# Patient Record
Sex: FEMALE | Race: ASIAN | NOT HISPANIC OR LATINO | URBAN - METROPOLITAN AREA
[De-identification: names, ages, dates, MRNs, and addresses within clinical notes are randomized per-mention and may not be internally consistent; named-entity substitution may affect disease eponyms.]

---

## 2019-02-09 ENCOUNTER — EMERGENCY (EMERGENCY)
Facility: HOSPITAL | Age: 59
LOS: 1 days | Discharge: HOME | End: 2019-02-09
Attending: EMERGENCY MEDICINE | Admitting: EMERGENCY MEDICINE
Payer: SELF-PAY

## 2019-02-09 VITALS
RESPIRATION RATE: 18 BRPM | SYSTOLIC BLOOD PRESSURE: 146 MMHG | DIASTOLIC BLOOD PRESSURE: 78 MMHG | HEART RATE: 55 BPM | TEMPERATURE: 98 F | OXYGEN SATURATION: 97 % | WEIGHT: 99.21 LBS

## 2019-02-09 VITALS — DIASTOLIC BLOOD PRESSURE: 66 MMHG | SYSTOLIC BLOOD PRESSURE: 143 MMHG | HEART RATE: 57 BPM

## 2019-02-09 PROCEDURE — 99283 EMERGENCY DEPT VISIT LOW MDM: CPT

## 2019-02-09 RX ORDER — LANOLIN/MINERAL OIL
1 LOTION (ML) TOPICAL
Qty: 396 | Refills: 2 | OUTPATIENT
Start: 2019-02-09 | End: 2019-05-09

## 2019-02-09 NOTE — ED PROVIDER NOTE - MEDICAL DECISION MAKING DETAILS
Patient presenting with dry skin/dermatitis NOS. Will Rx Aquaphor and triamcinolone. Rec Derm fu and getting a PMD.

## 2019-02-09 NOTE — ED PROVIDER NOTE - OBJECTIVE STATEMENT
58 F presenting with dry skin and itchy rash x 2 mos. She has no PMD and uses the ED for primary care. Not using anything for it. No new products or creams. No hx of same.

## 2019-02-09 NOTE — ED PROVIDER NOTE - CARE PROVIDER_API CALL
Sallie Martinez)  Dermatology  [address] [place] [address], [place] [address]  Phone: [phone]  Fax: [phone]  Follow Up Time:

## 2019-02-09 NOTE — ED PROVIDER NOTE - NSFOLLOWUPINSTRUCTIONS_ED_ALL_ED_FT
Please use Aquaphor ointment for dry skin.  Use Triamcinolone sparingly on itchy areas.  nellie follow up with a dermatologist and get a Primary care MD.   Our  will be calling to help you.  Please return to the ER for medical emergencies.

## 2019-02-09 NOTE — ED ADULT NURSE NOTE - NSIMPLEMENTINTERV_GEN_ALL_ED
Implemented All Universal Safety Interventions:  [place] to call system. Call bell, personal items and telephone within reach. Instruct patient to call for assistance. Room bathroom lighting operational. Non-slip footwear when patient is off stretcher. Physically safe environment: no spills, clutter or unnecessary equipment. Stretcher in lowest position, wheels locked, appropriate side rails in place.

## 2019-02-13 DIAGNOSIS — L29.9 PRURITUS, UNSPECIFIED: ICD-10-CM

## 2019-02-13 DIAGNOSIS — L85.3 XEROSIS CUTIS: ICD-10-CM

## 2024-05-28 NOTE — ED ADULT TRIAGE NOTE - PAIN: PRESENCE, MLM
Please advise,  Pharmacy rejected RX sent. Please send to alternate local pharmacy.             semaglutide-Weight Management (Wegovy) 2.4 MG/0.75ML injection 9 mL 0 5/27/2024 --    Sig - Route: Inject 2.4 mg into the skin every 7 days. Indications: OBESITY - Subcutaneous    Sent to pharmacy as: Wegovy 2.4 MG/0.75ML Subcutaneous Solution Auto-injector (semaglutide-Weight Management)         denies pain/discomfort